# Patient Record
Sex: FEMALE | Race: WHITE | Employment: OTHER | URBAN - METROPOLITAN AREA
[De-identification: names, ages, dates, MRNs, and addresses within clinical notes are randomized per-mention and may not be internally consistent; named-entity substitution may affect disease eponyms.]

---

## 2021-09-16 ENCOUNTER — HOSPITAL ENCOUNTER (OUTPATIENT)
Dept: PREADMISSION TESTING | Age: 76
Discharge: HOME | End: 2021-09-16
Payer: MEDICARE

## 2021-09-16 PROCEDURE — U0005 INFEC AGEN DETEC AMPLI PROBE: HCPCS

## 2021-09-16 PROCEDURE — U0003 INFECTIOUS AGENT DETECTION BY NUCLEIC ACID (DNA OR RNA); SEVERE ACUTE RESPIRATORY SYNDROME CORONAVIRUS 2 (SARS-COV-2) (CORONAVIRUS DISEASE [COVID-19]), AMPLIFIED PROBE TECHNIQUE, MAKING USE OF HIGH THROUGHPUT TECHNOLOGIES AS DESCRIBED BY CMS-2020-01-R: HCPCS

## 2021-09-16 RX ORDER — DIPHENHYDRAMINE HYDROCHLORIDE 50 MG/ML
50 INJECTION, SOLUTION INTRAMUSCULAR; INTRAVENOUS ONCE
Status: CANCELLED | OUTPATIENT
Start: 2021-09-16 | End: 2021-09-16

## 2021-09-16 RX ORDER — ATROPINE SULFATE 0.1 MG/ML
0.5 INJECTION INTRAVENOUS
Status: CANCELLED | OUTPATIENT
Start: 2021-09-16 | End: 2021-09-17

## 2021-09-16 RX ORDER — SODIUM CHLORIDE 0.9 % (FLUSH) 0.9 %
5-40 SYRINGE (ML) INJECTION AS NEEDED
Status: CANCELLED | OUTPATIENT
Start: 2021-09-16

## 2021-09-16 RX ORDER — SODIUM CHLORIDE 0.9 % (FLUSH) 0.9 %
5-40 SYRINGE (ML) INJECTION EVERY 8 HOURS
Status: CANCELLED | OUTPATIENT
Start: 2021-09-16

## 2021-09-16 RX ORDER — DEXTROMETHORPHAN/PSEUDOEPHED 2.5-7.5/.8
1.2 DROPS ORAL
Status: CANCELLED | OUTPATIENT
Start: 2021-09-16

## 2021-09-16 RX ORDER — EPINEPHRINE 0.1 MG/ML
1 INJECTION INTRACARDIAC; INTRAVENOUS
Status: CANCELLED | OUTPATIENT
Start: 2021-09-16 | End: 2021-09-17

## 2021-09-18 LAB — SARS-COV-2, NAA: NOT DETECTED

## 2021-09-20 ENCOUNTER — HOSPITAL ENCOUNTER (OUTPATIENT)
Age: 76
Setting detail: OUTPATIENT SURGERY
Discharge: HOME | End: 2021-09-20
Attending: INTERNAL MEDICINE | Admitting: INTERNAL MEDICINE
Payer: MEDICARE

## 2021-09-20 VITALS
WEIGHT: 193.2 LBS | HEART RATE: 60 BPM | OXYGEN SATURATION: 100 % | BODY MASS INDEX: 31.05 KG/M2 | DIASTOLIC BLOOD PRESSURE: 62 MMHG | SYSTOLIC BLOOD PRESSURE: 130 MMHG | TEMPERATURE: 96.9 F | RESPIRATION RATE: 16 BRPM | HEIGHT: 66 IN

## 2021-09-20 PROCEDURE — 77030040361 HC SLV COMPR DVT MDII -B: Performed by: INTERNAL MEDICINE

## 2021-09-20 PROCEDURE — 77030021593 HC FCPS BIOP ENDOSC BSC -A: Performed by: INTERNAL MEDICINE

## 2021-09-20 PROCEDURE — 77030039961 HC KT CUST COLON BSC -D: Performed by: INTERNAL MEDICINE

## 2021-09-20 PROCEDURE — 77030013991 HC SNR POLYP ENDOSC BSC -A: Performed by: INTERNAL MEDICINE

## 2021-09-20 PROCEDURE — G0500 MOD SEDAT ENDO SERVICE >5YRS: HCPCS | Performed by: INTERNAL MEDICINE

## 2021-09-20 PROCEDURE — 99153 MOD SED SAME PHYS/QHP EA: CPT | Performed by: INTERNAL MEDICINE

## 2021-09-20 PROCEDURE — 76040000007: Performed by: INTERNAL MEDICINE

## 2021-09-20 PROCEDURE — 2709999900 HC NON-CHARGEABLE SUPPLY: Performed by: INTERNAL MEDICINE

## 2021-09-20 PROCEDURE — 88305 TISSUE EXAM BY PATHOLOGIST: CPT

## 2021-09-20 PROCEDURE — 74011250636 HC RX REV CODE- 250/636: Performed by: INTERNAL MEDICINE

## 2021-09-20 RX ORDER — SODIUM CHLORIDE 9 MG/ML
1000 INJECTION, SOLUTION INTRAVENOUS CONTINUOUS
Status: DISCONTINUED | OUTPATIENT
Start: 2021-09-20 | End: 2021-09-20 | Stop reason: HOSPADM

## 2021-09-20 RX ORDER — AMLODIPINE BESYLATE 5 MG/1
5 TABLET ORAL DAILY
COMMUNITY

## 2021-09-20 RX ORDER — ROSUVASTATIN CALCIUM 5 MG/1
5 TABLET, COATED ORAL
COMMUNITY

## 2021-09-20 RX ORDER — FENTANYL CITRATE 50 UG/ML
100 INJECTION, SOLUTION INTRAMUSCULAR; INTRAVENOUS
Status: DISCONTINUED | OUTPATIENT
Start: 2021-09-20 | End: 2021-09-20 | Stop reason: HOSPADM

## 2021-09-20 RX ORDER — MIDAZOLAM HYDROCHLORIDE 1 MG/ML
.25-5 INJECTION, SOLUTION INTRAMUSCULAR; INTRAVENOUS
Status: DISCONTINUED | OUTPATIENT
Start: 2021-09-20 | End: 2021-09-20 | Stop reason: HOSPADM

## 2021-09-20 RX ORDER — FLUMAZENIL 0.1 MG/ML
0.2 INJECTION INTRAVENOUS
Status: DISCONTINUED | OUTPATIENT
Start: 2021-09-20 | End: 2021-09-20 | Stop reason: HOSPADM

## 2021-09-20 RX ORDER — VALSARTAN 80 MG/1
80 TABLET ORAL DAILY
COMMUNITY

## 2021-09-20 RX ORDER — NALOXONE HYDROCHLORIDE 0.4 MG/ML
0.4 INJECTION, SOLUTION INTRAMUSCULAR; INTRAVENOUS; SUBCUTANEOUS
Status: DISCONTINUED | OUTPATIENT
Start: 2021-09-20 | End: 2021-09-20 | Stop reason: HOSPADM

## 2021-09-20 RX ORDER — LEVOTHYROXINE SODIUM 100 UG/1
100 TABLET ORAL
COMMUNITY

## 2021-09-20 RX ORDER — SITAGLIPTIN AND METFORMIN HYDROCHLORIDE 500; 50 MG/1; MG/1
1 TABLET, FILM COATED ORAL DAILY
COMMUNITY

## 2021-09-20 RX ADMIN — SODIUM CHLORIDE 1000 ML: 900 INJECTION, SOLUTION INTRAVENOUS at 11:08

## 2021-09-20 NOTE — H&P
Assessment/Plan  # Detail Type Description    1. Assessment Encounter for screening for cancer of colon (Z12.11). Impression Pt of Dr. Ninoska Garland, here for 10yr Recall, for screening colonoscopy. Last colonoscopy (1st exam) was done on 1/26/2011 by Dr. Neil Wiggins @[facility], no polyps found with 10yr Recall, per Pt. Old records unavailable at this time, records recovered will be scanned to chart at later date. Average risk, no FHx of colon cancer. Asymptomatic of GI complaints. BMI: 30.99, BM: 1-2/day. PM/SH: Thyroidectomy (1965). No abd sx reported, no stroke or CVD hx.    Patient Plan *C-scope Plan:  Colonoscopy ordered with Dr. Jay West with Miralax bowel prep, and Mag Citrate 2 days before prep, and Miralax and stool softeners starting 3 days before prep.  -Patient is advised that they should take their aspirin (if prescribed) up until the day of procedure.  -Patient is advised to take Thyroid meds, BP meds, beta blockers, and any cardiac meds the AM of procedure with sip clear liquid after prep. *C-scope Risks:  Stressed importance of following all bowel preparation instructions. Explained the procedure to the patient including all risks and benefits. These risks consist of missed lesions on exam, bleeding, and bowel perforation with possible need for admission to the hospital, and in the most extensive of  circumstances, the patient may require surgery. Pt verbalized understanding of these risks and is agreeable with this procedure    Plan Orders Further diagnostic evaluations ordered today include(s) DIAGNOSTIC COLONOSCOPY to be performed. 2. Assessment Type 2 diabetes without complication (W30.8). Impression Diabetic since 2020, Taking Janumet. Patient Plan -Patient is advised not to take pills for diabetes the day ahead of the procedure.  Patient is advised (if insulin dependent) to take half of the usual long acting insulin the day before procedure (or as advised by the prescribing provider), and to follow accuchecks closely, 'rescuing' for a blood sugar below 90              This 70year old  patient was referred by Marcin Jacobsen. This 70year old female presents for Colon Cancer Screening. History of Present Illness  1. Colon Cancer Screening   Prior screening:  colonoscopy. Denies risk factors. Pertinent negatives include abdominal pain, change in bowel habits, change in stool caliber, constipation, decreased appetite, diarrhea, melena, nausea, rectal bleeding, vomiting, weight gain and weight loss. Additional information: No family history of colon cancer and BM: 1-2x daily. Last colonoscopy (1st exam) was done on 2011 by Dr. Annette Cool @[facility], no polyps found with 10yr Recall, per Pt. Old records unavailable at this time, records recovered will be scanned to chart at later date. Problem List  Problem Description Onset Date Chronic Clinical Status Notes   Mixed hyperlipidemia 10/10/2011 Y     Hypothyroidism 2013 Y     Benign essential hypertension 10/28/2011 Y     Type 2 diabetes mellitus 2021 N     Knee pain 10/09/2014 N     Screening for malignant neoplasm of colon done 2021 N       Past Medical/Surgical History   (Detailed)  Disease/disorder Onset Date Management Date Comments   hypertension       mixed hyperlipidemia         Cataract extraction       Thyroidectomy  -       Colonic Adenomas:  Negative Exams:  (Per Pt)         Gynecologic History  Patient is postmenopausal.     Obstetric History  Not currently pregnant. Family History   (Detailed)    Relationship Family Member Name  Age at Death Condition Onset Age Cause of Death       No family history of (CRC) colorectal cancer.   N     N    N   Father    Hypertension  N   Father    Depression  N   Father  N  Diabetes mellitus  N   Father    Hyperlipidemia  N   Father  N    N   Sister    Hypertension  N   Family History Comments  Relationship Family Member Name Condition Comments   Father   high chlolestrol     Social History  (Detailed)  Tobacco use reviewed. The patient is left-handed. Preferred language is [place]. Language spoken at home is [place]. The patient does not need an . Born in [place]. Education/Employment/Occupation  Employment History Status Retired Restrictions     retired       Marital Status/Family/Social Support  Marital status:      Children  Does not have children. Home Environment  Housing status is stable/permanent. The patient lives with opposite sex spouse. Tobacco use status: Never smoked tobacco.    Smoking status: Never smoker. Tobacco Screening  Patient has never used tobacco. Patient has not used tobacco in the last 30 days. Patient has not used smokeless tobacco in the last 30 days. Smoking Status  Type Smoking Status Usage Per Day Years Used Pack Years Total Pack Years    Never smoker         Tobacco/Vaping Exposure  No passive smoke exposure. Alcohol  There is no history of alcohol use. Caffeine  The patient uses caffeine: coffee - 8 oz a day. Lifestyle  Moderate activity level. Health club member. Exercises 3-4 times a week. Diet  healthy. The patient reports there are no animals in the home. Sleep Patterns  Patient has no changes to sleep patterns. Home Environment/Safety  The home has smoke detectors. No carbon monoxide detector at home. There is not a pool/spa at home. The home does not have radon present. Uses seat belts.  Experience  Patient has no  experience.       Medications (active prior to today)  Medication Instructions Start Date Stop Date Refilled Elsewhere   aspirin 81 mg Tab take 1 tablet (81MG)  by oral route  every day // 04/13/2012 Y   ROSUVASTATIN TABS 5MG TAKE 1 TABLET DAILY AT BEDTIME 01/28/2021 01/28/2021 N   L-THYROXINE TABS 100MCG TAKE 1 TABLET DAILY 01/28/2021 01/28/2021 N   Janumet  mg-1,000 mg tablet,extended release take 1 tablet by oral route  every day with the evening meal, swallowing whole. Do not crush, chew and/or divide. 03/10/2021  03/10/2021 N   methocarbamol 500 mg tablet take 2 tablet by oral route 4 times every day 05/25/2021   N   valsartan 160 mg tablet take 1 tablet by oral route  every day 05/25/2021 05/25/2021 N   Chaves's Goo  TOPICAL CREAM (G) Apply cream to affected area twice per day 07/07/2021   N     Patient Status   Completed with information received for patient in a summary of care record. Medication Reconciliation  Medications reconciled today. Medication Reviewed  Adherence Medication Name Sig Desc Elsewhere Status   taking as directed methocarbamol 500 mg tablet take 2 tablet by oral route 4 times every day N Verified   taking as directed Chaves's Goo  TOPICAL CREAM (G) Apply cream to affected area twice per day N Verified   taking as directed Janumet  mg-1,000 mg tablet,extended release take 1 tablet by oral route  every day with the evening meal, swallowing whole. Do not crush, chew and/or divide. N Verified   taking as directed valsartan 160 mg tablet take 1 tablet by oral route  every day N Verified   taking as directed aspirin 81 mg Tab take 1 tablet (81MG)  by oral route  every day Y Verified   taking as directed ROSUVASTATIN TABS 5MG TAKE 1 TABLET DAILY AT BEDTIME N Verified   taking as directed L-THYROXINE TABS 100MCG TAKE 1 TABLET DAILY N Verified     Medications (Added, Continued or Stopped today)  Start Date Medication Directions PRN Status PRN Reason Instruction Stop Date    aspirin 81 mg Tab take 1 tablet (81MG)  by oral route  every day N      07/07/2021 Chaves's Goo  TOPICAL CREAM (G) Apply cream to affected area twice per day N      03/10/2021 Janumet  mg-1,000 mg tablet,extended release take 1 tablet by oral route  every day with the evening meal, swallowing whole. Do not crush, chew and/or divide.  N      01/28/2021 L-THYROXINE TABS 100MCG TAKE 1 TABLET DAILY N      05/25/2021 methocarbamol 500 mg tablet take 2 tablet by oral route 4 times every day N      01/28/2021 ROSUVASTATIN TABS 5MG TAKE 1 TABLET DAILY AT BEDTIME N      05/25/2021 valsartan 160 mg tablet take 1 tablet by oral route  every day N        Allergies  Ingredient Reaction (Severity) Medication Name Comment   NO KNOWN ALLERGIES            Orders  Status Lab Order Time Frame Comments   ordered BMP     ordered TSH     ordered Lipid Measured LDL     ordered NMR     ordered SGPT     ordered SGOT     ordered Lipid Measured LDL     scheduled SGPT     scheduled SGOT     scheduled TSH     scheduled Lipid Measured LDL     scheduled NMR     ordered MAMMOGRAM, SCREENING     scheduled US EXAM, PELVIC, COMPLETE  Void 2 hours prior, and then drink 32 oz of water 1 hour to the appt. Appt time is at 1:00 pm with the arrival time of 12:30 pm.   specimen obtained Pap LB HPV High Risk     scheduled TSH     scheduled Lipid Measured LDL     ordered HDx Baseline     ordered HDx Baseline     ordered HDx Baseline     obtained CBC with Diff     obtained MONO SCREEN     ordered CMP     ordered TSH     ordered Lipid Measured LDL     ordered Vitamin D     ordered Apo A1 + B + Ratio     obtained CMP     obtained TSH     obtained Lipid Measured LDL     obtained Vitamin D     ordered Apo A1 + B + Ratio     obtained Fungus (Mycology) Culture     scheduled Referrals: Orthopedics.  Evaluate and treat     ordered follow-up visit 1 Month 1 Month    ordered HEMOGLOBIN A1C     ordered URIC ACID     ordered Urine Microalbumin Creatinine Ratio     ordered Diabetes Prevention & Management Panel     obtained * MRI JNT OF LWR EXTRE W/O DYE RT knee     obtained MAMMOGRAM, SCREENING     ordered Comp Metabolic Panel (14) AU     ordered Hemoglobin A1c     ordered Lipid Panel calc LDL     ordered LDL Cholesterol (Direct) AU     ordered TSH     result received Apo A1 + B + Ratio     ordered NMR LipoProfile     result received Hepatitis C Virus (HCV) Antibody With Reflex to Qualitative HALEY     result received Hemoglobin A1c  Fasting: Yes   result received Lipid Panel calc LDL  Fasting: Yes   result received Comp Metabolic Panel (14) AU  Fasting: Yes   result received LDL Cholesterol (Direct) AU  Fasting: Yes   result received TSH  Fasting: Yes   result received Pap Lb, HPV-hr     obtained HPV Screening     obtained PAP, liquid based     obtained PAP, liquid based     obtained Screening Digital Breast Ming Bilateral     result received Comp Metabolic Panel (14) AU     result received Hemoglobin A1c     result received Lipid Panel calc LDL     result received TSH     result received LDL Cholesterol (Direct) AU     ordered Hemoglobin A1c     ordered TSH     result received Hemoglobin A1c     ordered Dexa, Bone Density Scan     ordered Screening mammography digital     result received Screening mammography digital     result received Dexa, Bone Density Scan     result received Diagnostic mammography digital     ordered US Breast Limited Unilateral     result received CBC With Differential/Platelet     result received Comp Metabolic Panel (14) AU     result received Hemoglobin A1c     result received Lipid Panel calc LDL     result received Urine Microalb/Creat ratio     ordered Hemoglobin A1c     ordered Lipid Panel calc LDL     ordered TSH     ordered CBC With Differential/Platelet     result received Comp Metabolic Panel (14) AU     result received Hemoglobin A1c     result received Lipid Panel calc LDL     result received Urine Microalb/Creat ratio     result received TSH     result received CBC With Differential/Platelet     result received HCV Antibody reflex to HALEY     result received Screening mammography digital     result received Hemoglobin A1c     result received Lipid Panel calc LDL     result received Comp Metabolic Panel (14) AU     result received Hemoglobin A1c     result received Lipid Panel calc LDL     result received TSH     ordered Screening Mammography Digital     result received Screening Mammography Digital     ordered Lipid Panel calc LDL     ordered Hemoglobin A1c     ordered TSH     ordered Comp Metabolic Panel (14) AU     ordered Hemoglobin A1c     ordered Lipid Panel calc LDL     ordered TSH     ordered Comp Metabolic Panel (14) AU     ordered Urine Microalb/Creat ratio     ordered TSH     ordered Lipid Panel calc LDL     ordered Hemoglobin A1c     ordered Comp Metabolic Panel (14) AU     result received Screening Mammography Digital     ordered TSH     ordered Hemoglobin A1c     ordered Lipid Panel calc LDL     ordered Comp Metabolic Panel (14) AU     ordered Referrals: Podiatry. Benny Malloy DPM. Evaluate and treat     ordered Histopathology, Level III     ordered Pathology (tissue specimen)     result received Screening Mammography Digital     ordered Comp Metabolic Panel (14) AU     ordered TSH     ordered Hemoglobin A1c     ordered Lipid Panel calc LDL     ordered Referrals: Dermatology. Facundo Franco MD. Evaluate and treat     ordered Referrals: Gastroenterology     ordered Urine Microalb/Creat ratio     ordered Referrals: Gastroenterology     ordered Comp Metabolic Panel (14) AU     ordered Hemoglobin A1c     ordered TSH     ordered Dexa, Bone Density Scan     result received US EXTREMITY VENOUS, [employer]     ordered Referrals: Orthopedics  left LE pain -- + lower back, sciatica, left lateral hip, left hamstring. please eval and treat. ordered Referrals:  Physical Therapy. Evaluate and treat     ordered DIAGNOSTIC COLONOSCOPY         Review of Systems  System Neg/Pos Details   Constitutional Negative Fever, Weight gain and Weight loss. ENMT Negative Sinus Infection. Eyes Negative Double vision. Respiratory Negative Asthma, Chronic cough and Dyspnea. Cardio Negative Chest pain, Edema and Irregular heartbeat/palpitations.    GI Negative Abdominal pain, Change in bowel habits, Change in stool caliber, Constipation, Decreased appetite, Diarrhea, Dysphagia, Heartburn, Hematemesis, Hematochezia, Melena, Nausea, Rectal bleeding, Reflux and Vomiting.  Negative Dysuria and Hematuria. Endocrine Negative Cold intolerance and Heat intolerance. Neuro Negative Dizziness, Headache, Numbness and Tremors. Psych Negative Anxiety, Depression and Increased stress. Integumentary Negative Hives, Pruritus and Rash. MS Negative Back pain, Joint pain and Myalgia. Hema/Lymph Negative Easy bleeding, Easy bruising and Lymphadenopathy. Allergic/Immuno Negative Food allergies and Immunosuppression. Reproductive Positive The patient is post-menopausal.       Vital Signs   Gynecologic History  Patient is postmenopausal.      Height  Time ft in cm Last Measured Height Position   2:36 PM 5.0 6.00 167.64 07/13/2021 Standing     /Time Temp Pulse BP Arterial Line 1 BP (mmHg) BP Patient Position Resp SpO2 O2 Device O2 Flow Rate (L/min) Level of Consciousness MEWS Score Weight   09/20/21 1215 -- 74 -- -- -- -- 96 % -- -- -- -- --   09/20/21 1213 -- 71 181/86Abnormal  -- -- 16 98 % None (Room air) -- Alert (0) -- --   09/20/21 1102 97 °F (36.1 °C) 66 157/68Abnormal  -- -- 16 100 % None (Room air) -- Alert (0) 1 87.6 kg (193 lb 3.2 oz)       Physical  Exam  Exam Findings Details   Constitutional Normal Well developed. Eyes Normal Conjunctiva - Right: Normal, Left: Normal. Sclera - Right: Normal, Left: Normal.   Nasopharynx Normal Lips/teeth/gums - Normal.   Neck Exam Normal Inspection - Normal.   Respiratory Normal Inspection - Normal.   Cardiovascular Normal Regular rate and rhythm. No murmurs, gallops, or rubs. Vascular Normal Pulses - Brachial: Normal.   Skin Normal Inspection - Normal.   Musculoskeletal Normal Hands/Wrist - Right: Normal, Left: Normal.   Extremity Normal No edema. Neurological Normal Fine motor skills - Normal.   Psychiatric Normal Orientation - Oriented to time, place, person & situation. Appropriate mood and affect. Immunizations Entered by History  Date Immunization   2/27/2021 12:00:00 AM SARS-COV-2 (COVID-19) vaccine, mRNA, spike protein, LNP, preservative free, 30 mcg/0.3mL dose       Active Patient Care Team Members  Name Contact Agency Type Support Role Relationship Active Date Inactive Date Specialty   Margo Chambers   primary practice provider    [id number]   encounter provider    Gastroenterology   Joselyn Kirk   Patient provider PCP   Annamaria Condon 71   Emergency Contact Other        No change in H&P

## 2021-09-20 NOTE — PROCEDURES
[facility]  Colonoscopy Procedure Report  _______________________________________________________  Patient: Efren Lowry                                        Attending Physician: Karlos Taveras MD    Patient ID: [id number]                                    Referring Physician: Lizabeth Burris MD    Exam Date: 9/20/2021      Introduction: A  70 y.o. female patient, presents for inpatient Colonoscopy    Indications: Pt of Dr. Vivian Dhillon, here for 10yr Recall, for screening colonoscopy. Last colonoscopy (1st exam) was done on 1/26/2011 by Dr. Elier Salgado @[facility], no polyps found with 10yr Recall, per Pt. Old records unavailable at this time, records recovered will be scanned to chart at later date. Average risk, no FHx of colon cancer. Asymptomatic of GI complaints. BMI: 30.99, BM: 1-2/day. PM/SH: Thyroidectomy (1965). No abd sx reported, no stroke or CVD hx.    Consent: The benefits, risks, and alternatives to the procedure were discussed and informed consent was obtained from the patient. Preparation: EKG, pulse, pulse oximetry and blood pressure were monitored throughout the procedure. ASA Classification: Class II- . The heart is an S1-S2 and regular heart rate and rhythm. Lungs are clear to auscultation and percussion. Abdomen is soft, nondistended, and nontender. Mental Status: awake, alert, and oriented to person, place, and time    Medications:  · Fentanyl 100 mcg IV before procedure. · Versed 4 mg IV throughout the procedure. Rectal Exam: Normal Rectal Exam. No Blood. Pathology Specimens:  1    Procedure: The colonoscope was passed with difficulty through the anus under direct visualization and advanced to the cecum and 5 cm inside the terminal ileum. Retroflexion is made in the ascending colon. The scope was withdrawn and the mucosa was carefully examined. The quality of the preparation was excellent. The views were excellent.  The patient's toleration of the procedure was excellent. The exam was done twice to the cecum. Total time is 21 minutes and withdrawal time is 15 minutes. Findings:    Rectum:   Small internal hemorrhoids. Sigmoid:   Tortuous and loopy sigmoid colon and splenic flexure. Descending Colon:   Tortuous splenic flexure. 2 polyps in the descending colon one smooth 4 mm and the second flat but adenoma 5 mm, both cold snared. Transverse Colon:   Normal   Ascending Colon:   Normal  Cecum:   Normal  Terminal Ileum:   Normal.       Unplanned Events: There were no unplanned events. Estimated Blood Loss: Negligible  IMPLANTS: * No implants in log *  Impressions: Small internal hemorrhoids. Tortuous and loopy sigmoid colon and splenic flexure. 2 polyps in the descending colon one smooth 4 mm and the second flat but adenoma 5 mm, both cold snared. Normal Mucosa. No blood, diverticuli or AVM found. Complications: None; patient tolerated the procedure well. Recommendations:  · Discharge home when standard parameters are met. · Resume a high fiber diet. · Resume own medications. Avoid all NSAID's for 5 days. · Colonoscopy recommendation in 10 years.   · Take Miralax and/ or Colace 100 mg on regular basis if constipated    Procedure Codes:    Heriberto Bhardwaj [[id number]]    Endoscope Information:  Model Number(s)    [id number]   Assistant: None  Signed By: Kennie Brittle, MD Date: 9/20/2021

## 2021-09-20 NOTE — DISCHARGE INSTRUCTIONS
Radha Aquino  [id number]  1945    COLON DISCHARGE INSTRUCTIONS    Discomfort:  Redness at IV site- apply warm compress to area; if redness or soreness persist- contact your physician  There may be a slight amount of blood passed from the rectum  Gaseous discomfort- walking, belching will help relieve any discomfort  You may not operate a vehicle til the next day. You may not engage in an occupation involving machinery or appliances til the next day. You may not drink alcoholic beverages til the next day. DIET:   High fiber diet. ACTIVITY:  You may not  resume your normal daily activities til the next day. it is recommended that you spend the remainder of the day resting -  avoid any strenuous activity. CALL M.D.  IF ANY SIGN OF:   Increasing pain, nausea, vomiting  Abdominal distension (swelling)  New increased bleeding (oral or rectal)  Fever (chills)  Pain in chest area  Bloody discharge from nose or mouth  Shortness of breath    You may not  take any Advil, Aspirin, Ibuprofen, Motrin, Aleve, or Goodys for 5 days, ONLY  Tylenol as needed for pain. Post procedure diagnosis:  tortuous colon; POLYPS;    Follow-up Instructions: Your follow up colonoscopy will be in 10 years pending the result of the histology. We will notify you the results of your biopsy by letter within 2 weeks.     Kennie Brittle, MD  September 20, 2021       DISCHARGE SUMMARY from Nurse    The following personal items collected during your admission are returned to you:   Dental Appliance: Dental Appliances: None  Vision: Visual Aid: Glasses, Other (comment) (left with her ride)  Hearing Aid:    Lethaniel Shadow:    Clothing:    Other Valuables:    Valuables sent to safe:              PATIENT INSTRUCTIONS:    After general anesthesia or intravenous sedation, for 24 hours or while taking prescription Narcotics:  · Limit your activities  · Do not drive and operate hazardous machinery  · Do not make important personal or business decisions  · Do  not drink alcoholic beverages  · If you have not urinated within 8 hours after discharge, please contact your surgeon on call. Report the following to your surgeon:  · Excessive pain, swelling, redness or odor of or around the surgical area  · Temperature over 100.5  · Nausea and vomiting lasting longer than 4 hours or if unable to take medications  · Any signs of decreased circulation or nerve impairment to extremity: change in color, persistent  numbness, tingling, coldness or increase pain  · Any questions      No orders of the defined types were placed in this encounter. What to do at Home:  Recommended activity: as above,     If you experience any of the following symptoms as above, please follow up with Dr. Dawit David. *  Please give a list of your current medications to your Primary Care Provider. *  Please update this list whenever your medications are discontinued, doses are      changed, or new medications (including over-the-counter products) are added. *  Please carry medication information at all times in case of emergency situations. These are general instructions for a healthy lifestyle:    No smoking/ No tobacco products/ Avoid exposure to second hand smoke    Surgeon General's Warning:  Quitting smoking now greatly reduces serious risk to your health. Obesity, smoking, and sedentary lifestyle greatly increases your risk for illness    A healthy diet, regular physical exercise & weight monitoring are important for maintaining a healthy lifestyle    You may be retaining fluid if you have a history of heart failure or if you experience any of the following symptoms:  Weight gain of 3 pounds or more overnight or 5 pounds in a week, increased swelling in our hands or feet or shortness of breath while lying flat in bed. Please call your doctor as soon as you notice any of these symptoms; do not wait until your next office visit.     Recognize signs and symptoms of STROKE:    F-face looks uneven    A-arms unable to move or move unevenly    S-speech slurred or non-existent    T-time-call 911 as soon as signs and symptoms begin-DO NOT go       Back to bed or wait to see if you get better-TIME IS BRAIN. The discharge information has been reviewed with the patient and caregiver. The patient and caregiver verbalized understanding. Warning Signs of HEART ATTACK     Call 911 if you have these symptoms:   Chest discomfort. Most heart attacks involve discomfort in the center of the chest that lasts more than a few minutes, or that goes away and comes back. It can feel like uncomfortable pressure, squeezing, fullness, or pain.  Discomfort in other areas of the upper body. Symptoms can include pain or discomfort in one or both arms, the back, neck, jaw, or stomach.  Shortness of breath with or without chest discomfort.  Other signs may include breaking out in a cold sweat, nausea, or lightheadedness. Don't wait more than five minutes to call 911 - MINUTES MATTER! Fast action can save your life. Calling 911 is almost always the fastest way to get lifesaving treatment. Emergency Medical Services staff can begin treatment when they arrive -- up to an hour sooner than if someone gets to the hospital by car. The discharge information has been reviewed with the patient and caregiver. The patient and caregiver verbalized understanding. Discharge medications reviewed with the patient and guardian and appropriate educational materials and side effects teaching were provided.     Patient armband removed and shredded

## 2025-01-27 ENCOUNTER — HOSPITAL ENCOUNTER (OUTPATIENT)
Facility: HOSPITAL | Age: 80
Setting detail: RECURRING SERIES
Discharge: HOME | End: 2025-01-30
Payer: MEDICARE

## 2025-01-27 PROCEDURE — 97112 NEUROMUSCULAR REEDUCATION: CPT

## 2025-01-27 PROCEDURE — 97530 THERAPEUTIC ACTIVITIES: CPT

## 2025-01-27 PROCEDURE — 97162 PT EVAL MOD COMPLEX 30 MIN: CPT

## 2025-01-27 NOTE — PROGRESS NOTES
[facility] at the [address], [address], [place] [address]   Phone: [phone]     Fax: [phone]       Plan of Care/ Statement of Necessity for Physical Therapy Services    Patient name: Clari Mccauley Start of Care: 2025   Referral source: Adi Farris MD : 1945    Medical Diagnosis: Right shoulder pain [M25.511]       Onset Date:10/1/24    Treatment Diagnosis: M25.511  RIGHT SHOULDER PAIN                            Prior Hospitalization: see medical history Provider#: [id number]     Comorbidities: Other: right knee pain, hx right sciatica    Prior Level of Function: full function    If an interpreting service is utilized for treatment of this patient, the contents of this document represent the material reviewed with the patient via the .       The Plan of Care and following information is based on the information from the initial evaluation.  Assessment/ key information: 79 YOF is reporting onset of acute right shoulder pain 10/10 and inability to lift arm after raking leaves last fall for several days in a row. Patient reports initial self medication and use of MH/ice however without success so patient consulted with PCP in 2025. Patient was referred to Ortho and received Cortisone injection on 25. Patient reports that she has been pain free since then with good AROM right shoulder. Objective findings include mild right shoulder strength deficit, bilateral HS/core strength deficit, lumbo-pelvic dysfunction, slight limitation in end range shoulder ROM and HGIR. Patient also demonstrates asymmetrical postural alignment with elevated left shoulder/scapula. Empty can testing is WNL and patient reports no pain reproduction with MMT. Patient is a good candidate for skilled PT to address deficits in postural alignment, ROM and strength in order to return to full function with optimal postural alignment.

## 2025-01-27 NOTE — PROGRESS NOTES
PT DAILY TREATMENT NOTE/SHOULDER EVAL     Patient Name: Clari Mccauley    Date: 2025    : 1945  Insurance: Payor: MEDICARE / Plan: MEDICARE PART A AND B / Product Type: *No Product type* /      Patient  verified yes     Visit #   Current / Total 1 24   Time   In / Out 1125 1205   Pain   In / Out 0 0   Subjective Functional Status/Changes: No pain since cortisone injection on 25. Reports full ROM versus previous inability to lift her arm.    Changes to:  Meds, Allergies, Med Hx, Sx Hx?  If yes, update Summary List no       TREATMENT AREA =  Right shoulder pain [M25.511]    SUBJECTIVE  Pain Level (0-10 scale): 0  []constant []intermittent []improving []worsening []no change since onset    Any medication changes, allergies to medications, adverse drug reactions, diagnosis change, or new procedure performed?: [x] No    [] Yes (see summary sheet for update)  Subjective functional status/changes: Reports onset of right shoulder pain when raking leaves for multiple days overusing right less dominant hand in 10/2024. At one point patient reports being unable to lift her right arm. Started self medication including Tylenol for arthritis without change, then Advil which helped only initially, use of MH, ice-MD consult beginning  and referral to Ortho on 25 at which time she got a cortisone injection. X-ray showed inflammation as per patient otherwise negative.     PLOF: No hx of previous shoulder pain with full function. Left dominant  Limitations to PLOF: full ROM and ADL without pain currently since recent injection  Mechanism of Injury: overuse  Current symptoms/Complaints: no pain  Previous Treatment/Compliance: n/a  PMHx/Surgical Hx: thyroidectomy at age 17 (at first partial and later total)-doing well managing it with medication  Work Hx: retired  for PT/OT, teacher  Living Situation: 2 level  Pt Goals: get stronger in my arms  Barriers: []pain []financial

## 2025-01-30 ENCOUNTER — HOSPITAL ENCOUNTER (OUTPATIENT)
Facility: HOSPITAL | Age: 80
Setting detail: RECURRING SERIES
End: 2025-01-30
Payer: MEDICARE

## 2025-01-30 PROCEDURE — 97530 THERAPEUTIC ACTIVITIES: CPT

## 2025-01-30 PROCEDURE — 97112 NEUROMUSCULAR REEDUCATION: CPT

## 2025-01-30 PROCEDURE — 97110 THERAPEUTIC EXERCISES: CPT

## 2025-01-30 NOTE — PROGRESS NOTES
PHYSICAL / OCCUPATIONAL THERAPY - DAILY TREATMENT NOTE     Patient Name: Clari Mccauley    Date: 2025    : 1945  Insurance: Payor: MEDICARE / Plan: MEDICARE PART A AND B / Product Type: *No Product type* /      Patient  verified Yes     Visit #   Current / Total 2 12   Time   In / Out 955 1040   Pain   In / Out 0 0   Subjective Functional Status/Changes: Reports significant core soreness from HEP but no shoulder pain   Changes to:  Allergies, Med Hx, Sx Hx?   no       TREATMENT AREA =  Right shoulder pain [M25.511]    If an interpreting service is utilized for treatment of this patient, the contents of this document represent the material reviewed with the patient via the .     OBJECTIVE      Therapeutic Procedures:  Tx Min Billable or 1:1 Min (if diff from Tx Min) Procedure, Rationale, Specifics   15  80722 Therapeutic Exercise (timed):  increase ROM, strength, coordination, balance, and proprioception to improve patient's ability to progress to PLOF and address remaining functional goals. (see flow sheet as applicable)    Details if applicable:       20  06240 Neuromuscular Re-Education (timed):  improve balance, coordination, kinesthetic sense, posture, core stability and proprioception to improve patient's ability to develop conscious control of individual muscles and awareness of position of extremities in order to progress to PLOF and address remaining functional goals. (see flow sheet as applicable)    Details if applicable:repositioning     10  23751 Therapeutic Activity (timed):  use of dynamic activities replicating functional movements to increase ROM, strength, coordination, balance, and proprioception in order to improve patient's ability to progress to PLOF and address remaining functional goals.  (see flow sheet as applicable)     Details if applicable:  patient education about proper gait pattern to promote big toe Ext, also demonstrated seated and plank

## 2025-02-04 ENCOUNTER — HOSPITAL ENCOUNTER (OUTPATIENT)
Facility: HOSPITAL | Age: 80
Setting detail: RECURRING SERIES
Discharge: HOME | End: 2025-02-07
Payer: MEDICARE

## 2025-02-04 PROCEDURE — 97112 NEUROMUSCULAR REEDUCATION: CPT

## 2025-02-04 PROCEDURE — 97530 THERAPEUTIC ACTIVITIES: CPT

## 2025-02-04 PROCEDURE — 97110 THERAPEUTIC EXERCISES: CPT

## 2025-02-04 NOTE — PROGRESS NOTES
PHYSICAL / OCCUPATIONAL THERAPY - DAILY TREATMENT NOTE     Patient Name: Clari Mccauley    Date: 2025    : 1945  Insurance: Payor: MEDICARE / Plan: MEDICARE PART A AND B / Product Type: *No Product type* /      Patient  verified Yes     Visit #   Current / Total 3 12   Time   In / Out 0908 1005   Pain   In / Out 0/10 0/10   Subjective Functional Status/Changes: Patient states that she feels that she's getting a little better as she's noting less cramping with exercise.   Changes to:  Allergies, Med Hx, Sx Hx?   no       TREATMENT AREA =  Right shoulder pain [M25.511]    If an interpreting service is utilized for treatment of this patient, the contents of this document represent the material reviewed with the patient via the .     OBJECTIVE    Therapeutic Procedures:  Tx Min Billable or 1:1 Min (if diff from Tx Min) Procedure, Rationale, Specifics   15  21992 Therapeutic Exercise (timed):  increase ROM, strength, coordination, balance, and proprioception to improve patient's ability to progress to PLOF and address remaining functional goals. (see flow sheet as applicable)    Details if applicable:       12  94749 Neuromuscular Re-Education (timed):  improve balance, coordination, kinesthetic sense, posture, core stability and proprioception to improve patient's ability to develop conscious control of individual muscles and awareness of position of extremities in order to progress to PLOF and address remaining functional goals. (see flow sheet as applicable)    Details if applicable:     12  48034 Therapeutic Activity (timed):  use of dynamic activities replicating functional movements to increase ROM, strength, coordination, balance, and proprioception in order to improve patient's ability to progress to PLOF and address remaining functional goals.  (see flow sheet as applicable)     Details if applicable:     39  [facility] Totals Reminder: bill using total billable min of TIMED

## 2025-02-07 ENCOUNTER — HOSPITAL ENCOUNTER (OUTPATIENT)
Facility: HOSPITAL | Age: 80
Setting detail: RECURRING SERIES
Discharge: HOME | End: 2025-02-10
Payer: MEDICARE

## 2025-02-07 PROCEDURE — 97110 THERAPEUTIC EXERCISES: CPT

## 2025-02-07 PROCEDURE — 97112 NEUROMUSCULAR REEDUCATION: CPT

## 2025-02-07 NOTE — PROGRESS NOTES
PHYSICAL / OCCUPATIONAL THERAPY - DAILY TREATMENT NOTE     Patient Name: Clari Mccauley    Date: 2025    : 1945  Insurance: Payor: MEDICARE / Plan: MEDICARE PART A AND B / Product Type: *No Product type* /      Patient  verified Yes     Visit #   Current / Total 4 12   Time   In / Out 0921 1010   Pain   In / Out 0/10 010   Subjective Functional Status/Changes: Patient reports mild soreness after last session with this PT.  She is very pleased with her progress in the upper body at this time.    Changes to:  Allergies, Med Hx, Sx Hx?   no       TREATMENT AREA =  Right shoulder pain [M25.511]    If an interpreting service is utilized for treatment of this patient, the contents of this document represent the material reviewed with the patient via the .     OBJECTIVE    Therapeutic Procedures:  Tx Min Billable or 1:1 Min (if diff from Tx Min) Procedure, Rationale, Specifics   18 13 72347 Neuromuscular Re-Education (timed):  improve balance, coordination, kinesthetic sense, posture, core stability and proprioception to improve patient's ability to develop conscious control of individual muscles and awareness of position of extremities in order to progress to PLOF and address remaining functional goals. (see flow sheet as applicable)    Details if applicable:       16 10 67659 Therapeutic Exercise (timed):  increase ROM, strength, coordination, balance, and proprioception to improve patient's ability to progress to PLOF and address remaining functional goals. (see flow sheet as applicable)    Details if applicable:     15 0 71773 Therapeutic Activity (timed):  use of dynamic activities replicating functional movements to increase ROM, strength, coordination, balance, and proprioception in order to improve patient's ability to progress to PLOF and address remaining functional goals.  (see flow sheet as applicable)     Details if applicable:     49 23 [facility] Totals Reminder: bill using

## 2025-02-12 ENCOUNTER — HOSPITAL ENCOUNTER (OUTPATIENT)
Facility: HOSPITAL | Age: 80
Setting detail: RECURRING SERIES
Discharge: HOME | End: 2025-02-15
Payer: MEDICARE

## 2025-02-12 PROCEDURE — 97530 THERAPEUTIC ACTIVITIES: CPT

## 2025-02-12 PROCEDURE — 97110 THERAPEUTIC EXERCISES: CPT

## 2025-02-12 PROCEDURE — 97112 NEUROMUSCULAR REEDUCATION: CPT

## 2025-02-12 NOTE — PROGRESS NOTES
Note/Recertification    Short Term Goals: To be accomplished in 3 weeks:  Patient will be independent and compliant with HEP to progress toward goals and restore functional mobility.   Eval Status: issued at eval  Current 1/30/25: excellent compliance with HEP, updated today     Patient demonstrates WNL HGIR indicating optimal shoulder/rib cage function/mobility in order to allow dynamic ADL              Eval Status:HGIR restricted bilaterally indicating sub-optimal rib mobility and shoulder function  Current 1/30/25:Pre /post ÁNGEL :  HGIR right 65/90, left 80/90           Long Term Goals: To be accomplished in 6 weeks     Pt will have painfree right shoulder function with all dynamic activities in order to return to PLOF  Eval Status: reports 10/10 pain 10/2024 after excessive yard work and raking, currently no pain since recent cortisone injection on 1/14/25  Current 1/30/25: 0/10 this session     Patient is demonstrating ability to perform bridging x10 without reports of HS cramping indicating functional glut/HS strength required for more optimal trunk/rib cage position and shoulder function  Eval Status: performs bridge x1 with immediate right HS cramping , demonstrates mild bilateral rib flare, left scapula elevated  Current (02/04/2025): patient able to attain moderate lift this date and no HS cramping until after all repetitions of exercise were complete today, progressing     3. Pt will have 5/5 right shoulder strength to return to goals of performing all ADL and yard work.  Eval Status:   Strength: shoulders                                                                             Left (1-5) Right (1-5) Pain   Flexors 5 5- [] Yes   [x] No   Abductors 5 5- [] Yes   [x] No   External Rotators 5 5- [] Yes   [x] No   Internal Rotators 5 5 [] Yes   [x] No   Supraspinatus 5 5- [] Yes   [x] No   Serratus Anterior 4 4 [] Yes   [x] No   Lower Trapezius 4 4 [] Yes   [x] No   Elbow Flexion 5 5 [] Yes   [x] No   Elbow

## 2025-02-14 ENCOUNTER — APPOINTMENT (OUTPATIENT)
Facility: HOSPITAL | Age: 80
End: 2025-02-14
Payer: MEDICARE

## 2025-02-18 ENCOUNTER — APPOINTMENT (OUTPATIENT)
Facility: HOSPITAL | Age: 80
End: 2025-02-18
Payer: MEDICARE

## 2025-02-21 ENCOUNTER — APPOINTMENT (OUTPATIENT)
Facility: HOSPITAL | Age: 80
End: 2025-02-21
Payer: MEDICARE

## 2025-02-24 ENCOUNTER — HOSPITAL ENCOUNTER (OUTPATIENT)
Facility: HOSPITAL | Age: 80
Setting detail: RECURRING SERIES
Discharge: HOME | End: 2025-02-27
Payer: MEDICARE

## 2025-02-24 PROCEDURE — 97530 THERAPEUTIC ACTIVITIES: CPT

## 2025-02-24 PROCEDURE — 97110 THERAPEUTIC EXERCISES: CPT

## 2025-02-24 PROCEDURE — 97112 NEUROMUSCULAR REEDUCATION: CPT

## 2025-02-24 NOTE — PROGRESS NOTES
[facility] [address] [place], [place] [address]  Phone: [phone]      Fax:  [phone]    Progress Note    Patient name: Clari Mccauley Start of Care: 2025    Referral source: Adi Farris MD : 1945   Medical/Treatment Diagnosis: Right shoulder pain [M25.511] Onset Date:10/1/24       Prior Hospitalization: see medical history Provider#: [id number]   Comorbidities: Other: right knee pain, hx right sciatica     Prior Level of Function: full function    Reporting Period: 25-25    Visits from Start of Care: 6    Missed Visits: 0    If an interpreting service is utilized for treatment of this patient, the contents of this document represent the material reviewed with the patient via the .     Updated Goals/Measure of Progress:     Short Term Goals: To be accomplished in 3 weeks:  Patient will be independent and compliant with HEP to progress toward goals and restore functional mobility.   Eval Status: issued at eval  Status on 25: excellent compliance with HEP, updated today  Current 25: frequent compliance of HEP      Patient demonstrates WNL HGIR indicating optimal shoulder/rib cage function/mobility in order to allow dynamic ADL              Eval Status:HGIR restricted bilaterally indicating sub-optimal rib mobility and shoulder function  Current 25: right = 84 deg, left = 85deg - almost met (measurements post interventions)         Long Term Goals: To be accomplished in 6 weeks     Pt will have painfree right shoulder function with all dynamic activities in order to return to PLOF  Eval Status: reports 10/10 pain 10/2024 after excessive yard work and raking, currently no pain since recent cortisone injection on 25  Current 25: pt was absent of pain for the last 2-3 week     Patient is demonstrating ability to perform bridging x10 without reports of HS cramping indicating functional

## 2025-02-24 NOTE — PROGRESS NOTES
PHYSICAL / OCCUPATIONAL THERAPY - DAILY TREATMENT NOTE     Patient Name: Clari Mccauley    Date: 2025    : 1945  Insurance: Payor: MEDICARE / Plan: MEDICARE PART A AND B / Product Type: *No Product type* /      Patient  verified Yes     Visit #   Current / Total 6 12   Time   In / Out 2:40pm 3:20pm   Pain   In / Out 0 0   Subjective Functional Status/Changes: The pt states she has been absent from pain for 2-3 weeks with possible influence of the cortisone injection    Changes to:  Allergies, Med Hx, Sx Hx?   yes  - only new medication is for a cold, no new medication for shoulder pain      TREATMENT AREA =  Right shoulder pain [M25.511]    If an interpreting service is utilized for treatment of this patient, the contents of this document represent the material reviewed with the patient via the .     OBJECTIVE  Therapeutic Procedures:  Tx Min Billable or 1:1 Min (if diff from Tx Min) Procedure, Rationale, Specifics   16  70660 Therapeutic Exercise (timed):  increase ROM, strength, coordination, balance, and proprioception to improve patient's ability to progress to PLOF and address remaining functional goals. (see flow sheet as applicable)    Details if applicable:       10  57138 Neuromuscular Re-Education (timed):  improve balance, coordination, kinesthetic sense, posture, core stability and proprioception to improve patient's ability to develop conscious control of individual muscles and awareness of position of extremities in order to progress to PLOF and address remaining functional goals. (see flow sheet as applicable)    Details if applicable:     14  09987 Therapeutic Activity (timed):  use of dynamic activities replicating functional movements to increase ROM, strength, coordination, balance, and proprioception in order to improve patient's ability to progress to PLOF and address remaining functional goals.  (see flow sheet as applicable)     Details if applicable:

## 2025-02-25 ENCOUNTER — TELEPHONE (OUTPATIENT)
Facility: HOSPITAL | Age: 80
End: 2025-02-25

## 2025-02-26 ENCOUNTER — HOSPITAL ENCOUNTER (OUTPATIENT)
Facility: HOSPITAL | Age: 80
Setting detail: RECURRING SERIES
Discharge: HOME | End: 2025-03-01
Payer: MEDICARE

## 2025-02-26 PROCEDURE — 97112 NEUROMUSCULAR REEDUCATION: CPT

## 2025-02-26 PROCEDURE — 97110 THERAPEUTIC EXERCISES: CPT

## 2025-02-26 PROCEDURE — 97530 THERAPEUTIC ACTIVITIES: CPT

## 2025-02-26 NOTE — DISCHARGE SUMMARY
[facility] at the [address] [place] [place], [place] [address]  Phone: [phone]      Fax:  [phone]            Discharge Summary    Patient name: Clari Mccauley     Start of Care: ***  Referral source: Adi Farris MD    : 1945  Medical/Treatment Diagnosis: Right shoulder pain [M25.511]  Onset Date:***  Prior Hospitalization: see medical history   Provider#: [id number]  Comorbidities: ***  Prior Level of Function:***      Visits from Start of Care: ***    Missed Visits: ***      If an interpreting service is utilized for treatment of this patient, the contents of this document represent the material reviewed with the patient via the .     Reporting Period : *** to ***    Goals/Measure of Progress:  ***      Assessment/ Summary of Care: ***    RECOMMENDATIONS:  []Discontinue therapy: []Patient has reached or is progressing toward set goals      []Patient is non-compliant or has abdicated      []Due to lack of appreciable progress towards set goals    Haylie Uribe, PT 2025 5:12 PM

## 2025-02-26 NOTE — PROGRESS NOTES
PHYSICAL / OCCUPATIONAL THERAPY - DAILY TREATMENT NOTE     Patient Name: Clari Mccauley    Date: 2025    : 1945  Insurance: Payor: MEDICARE / Plan: MEDICARE PART A AND B / Product Type: *No Product type* /      Patient  verified Yes     Visit #   Current / Total 7 12   Time   In / Out 210 ***   Pain   In / Out 0 ***   Subjective Functional Status/Changes: Reports no pain. Signed up for personal training at [facility] and is requesting discharge.    Changes to:  Allergies, Med Hx, Sx Hx?   no       TREATMENT AREA =  Right shoulder pain [M25.511]    If an interpreting service is utilized for treatment of this patient, the contents of this document represent the material reviewed with the patient via the .     OBJECTIVE        Therapeutic Procedures:  Tx Min Billable or 1:1 Min (if diff from Tx Min) Procedure, Rationale, Specifics   10  [id number] Therapeutic Exercise (timed):  increase ROM, strength, coordination, balance, and proprioception to improve patient's ability to progress to PLOF and address remaining functional goals. (see flow sheet as applicable)    Details if applicable:       10  07935 Neuromuscular Re-Education (timed):  improve balance, coordination, kinesthetic sense, posture, core stability and proprioception to improve patient's ability to develop conscious control of individual muscles and awareness of position of extremities in order to progress to PLOF and address remaining functional goals. (see flow sheet as applicable)    Details if applicable:repositioning with focus on rib mobility and WNL HGIR for optimal function with ADL and workout routine     ***  94530 Therapeutic Activity (timed):  use of dynamic activities replicating functional movements to increase ROM, strength, coordination, balance, and proprioception in order to improve patient's ability to progress to PLOF and address remaining functional goals.  (see flow sheet as applicable)     Details

## (undated) DEVICE — KENDALL RADIOLUCENT FOAM MONITORING ELECTRODE RECTANGULAR SHAPE: Brand: KENDALL

## (undated) DEVICE — CATH IV SAFE STR 22GX1IN BLU -- PROTECTIV PLUS

## (undated) DEVICE — SINGLE PORT MANIFOLD: Brand: NEPTUNE 2

## (undated) DEVICE — Device

## (undated) DEVICE — SYR 5ML 1/5 GRAD LL NSAF LF --

## (undated) DEVICE — CANNULA CUSH AD W/ 14FT TBG

## (undated) DEVICE — NDL FLTR TIP 5 MIC 18GX1.5IN --

## (undated) DEVICE — SYRINGE 50ML E/T

## (undated) DEVICE — GARMENT,MEDLINE,DVT,INT,CALF,MED, GEN2: Brand: MEDLINE

## (undated) DEVICE — CATH SUC CTRL PRT TRIFLO 14FR --

## (undated) DEVICE — WRISTBAND ID AD W2.5XL9.5CM RED VYN ADH CLSR UNI-PRINT

## (undated) DEVICE — TUBING, SUCTION, 1/4" X 12', STRAIGHT: Brand: MEDLINE

## (undated) DEVICE — SET ADMIN 16ML TBNG L100IN 2 Y INJ SITE IV PIGGY BK DISP

## (undated) DEVICE — MAJ-1414 SINGLE USE ADPATER BIOPSY VALV: Brand: SINGLE USE ADAPTOR BIOPSY VALVE

## (undated) DEVICE — SPONGE GZ W4XL4IN COT 12 PLY TYP VII WVN C FLD DSGN

## (undated) DEVICE — NDL PRT INJ NSAF BLNT 18GX1.5 --

## (undated) DEVICE — FORCEPS BX CAP 240CM L RAD JAW 4

## (undated) DEVICE — SNARE POLYP SM W13MMXL240CM SHTH DIA2.4MM OVL FLX DISP

## (undated) DEVICE — SOLUTION IV 500ML 0.9% SOD CHL FLX CONT

## (undated) DEVICE — SYR 3ML LL TIP 1/10ML GRAD --

## (undated) DEVICE — SPONGE GZ W4XL4IN RAYON POLY 4 PLY NONWOVEN FASTER WICKING

## (undated) DEVICE — TRAP SPEC COLL POLYP POLYSTYR --

## (undated) DEVICE — TRNQT TEXT 1X18IN BLU LF DISP -- CONVERT TO ITEM 362165